# Patient Record
Sex: MALE | Race: WHITE
[De-identification: names, ages, dates, MRNs, and addresses within clinical notes are randomized per-mention and may not be internally consistent; named-entity substitution may affect disease eponyms.]

---

## 2019-07-19 ENCOUNTER — HOSPITAL ENCOUNTER (OUTPATIENT)
Dept: HOSPITAL 61 - KCIC MRI | Age: 49
Discharge: HOME | End: 2019-07-19
Attending: ORTHOPAEDIC SURGERY
Payer: COMMERCIAL
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
## 2019-07-19 DIAGNOSIS — M94.211: ICD-10-CM

## 2019-07-19 DIAGNOSIS — S46.011A: Primary | ICD-10-CM
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
## 2019-07-19 DIAGNOSIS — S42.144A: ICD-10-CM

## 2019-07-19 DIAGNOSIS — M19.011: ICD-10-CM

## 2019-07-19 PROCEDURE — 73221 MRI JOINT UPR EXTREM W/O DYE: CPT

## 2019-07-19 NOTE — [facility]
Summation: MRI of the right shoulder without contrast

 

HISTORY: History of right shoulder pain, history of right shoulder 

fracture

 

COMPARISON: None available 

 

Technique: Multiplanar, multisequence MR imaging of the right shoulder 

were performed without contrast. Sagittal obliques could not be obtained 

as patient is too claustrophobic.

 

FINDINGS:

 

 

The long head of the biceps tendon within the bicipital groove. The 

attachment of the long head the biceps tendon to the superior labral 

anchor grossly appears intact.

 

Increased signal identified in the undersurface fibers of the 

supraspinatus tendon likely high-grade partial thickness undersurface tear

at its attachment to the greater tuberosity. Moderate increased signal 

identified in the supraspinatus, interspinous tendon likely tendinosis.

 

The attachment of the subscapularis, infraspinatus tendon grossly appears 

intact. The visualized labrum grossly appears unremarkable. Moderate 

degenerative changes identified in the acromioclavicular joint. 

Examination limited due to motion artifact.

 

The acromion is type II. The muscle bulk grossly appears unremarkable. 

There is fraying of cartilage identified in the glenohumeral joint. Mild 

obscuration of fat and due to interval.

 

IMPRESSION:

 

 

1. High-grade partial-thickness tear supraspinatus tendon.

 

2. Moderate degenerative changes glenohumeral joint, acromioclavicular 

joint. Chondromalacia shoulder joint.

 

3. Tendinosis of the rotator cuff.

 

 

 

 

 

 

 

 

 

 

 

 

Electronically signed by: Brodie Snow MD (7/19/2019 3:59 PM) [id number]